# Patient Record
Sex: FEMALE | Race: BLACK OR AFRICAN AMERICAN | NOT HISPANIC OR LATINO | URBAN - METROPOLITAN AREA
[De-identification: names, ages, dates, MRNs, and addresses within clinical notes are randomized per-mention and may not be internally consistent; named-entity substitution may affect disease eponyms.]

---

## 2023-12-27 ENCOUNTER — TELEPHONE (OUTPATIENT)
Dept: OPHTHALMOLOGY | Facility: CLINIC | Age: 68
End: 2023-12-27
Payer: MEDICARE

## 2023-12-27 NOTE — TELEPHONE ENCOUNTER
----- Message from Chasity Fowler sent at 12/27/2023 12:05 PM CST -----  Regarding: FW: Referral    ----- Message -----  From: Lisette Xiong  Sent: 12/27/2023  11:55 AM CST  To: [facility] Staff  Subject: Referral                                         Rainer,    Provider Mustapha Dawson would like to refer the patient to the Ophthalmology department.        I have scanned the patients referral/records into .     Please review and contact patient to schedule appointment. If there are no appointments available at this time, please advise and I will inform the referring provider/clinic      Thank you,   [facility]

## 2024-01-03 ENCOUNTER — TELEPHONE (OUTPATIENT)
Dept: OPHTHALMOLOGY | Facility: CLINIC | Age: 69
End: 2024-01-03
Payer: MEDICARE

## 2024-01-23 ENCOUNTER — TELEPHONE (OUTPATIENT)
Dept: OPHTHALMOLOGY | Facility: CLINIC | Age: 69
End: 2024-01-23
Payer: MEDICARE

## 2024-01-24 ENCOUNTER — OFFICE VISIT (OUTPATIENT)
Dept: OPHTHALMOLOGY | Facility: CLINIC | Age: 69
End: 2024-01-24
Payer: MEDICARE

## 2024-01-24 DIAGNOSIS — Z96.1 PSEUDOPHAKIA OF BOTH EYES: ICD-10-CM

## 2024-01-24 DIAGNOSIS — H04.421 CHRONIC LACRIMAL CANALICULITIS OF RIGHT LACRIMAL PASSAGE: Primary | ICD-10-CM

## 2024-01-24 PROCEDURE — 1101F PT FALLS ASSESS-DOCD LE1/YR: CPT | Mod: CPTII,S$GLB,, | Performed by: OPHTHALMOLOGY

## 2024-01-24 PROCEDURE — 68840 EXPLORE/IRRIGATE TEAR DUCTS: CPT | Mod: 50,S$GLB,, | Performed by: OPHTHALMOLOGY

## 2024-01-24 PROCEDURE — 99204 OFFICE O/P NEW MOD 45 MIN: CPT | Mod: 25,S$GLB,, | Performed by: OPHTHALMOLOGY

## 2024-01-24 PROCEDURE — 3288F FALL RISK ASSESSMENT DOCD: CPT | Mod: CPTII,S$GLB,, | Performed by: OPHTHALMOLOGY

## 2024-01-24 PROCEDURE — 92285 EXTERNAL OCULAR PHOTOGRAPHY: CPT | Mod: S$GLB,,, | Performed by: OPHTHALMOLOGY

## 2024-01-24 PROCEDURE — 1159F MED LIST DOCD IN RCRD: CPT | Mod: CPTII,S$GLB,, | Performed by: OPHTHALMOLOGY

## 2024-01-24 PROCEDURE — 1160F RVW MEDS BY RX/DR IN RCRD: CPT | Mod: CPTII,S$GLB,, | Performed by: OPHTHALMOLOGY

## 2024-01-24 PROCEDURE — 99999 PR PBB SHADOW E&M-EST. PATIENT-LVL I: CPT | Mod: PBBFAC,,, | Performed by: OPHTHALMOLOGY

## 2024-01-24 RX ORDER — AMLODIPINE BESYLATE 5 MG/1
5 TABLET ORAL DAILY
COMMUNITY

## 2024-01-24 NOTE — PROGRESS NOTES
HPI    Shi Miranda is a/an 68 y.o. female here for possible dacryocystitis or   canaliculitis    Eye Meds:   Using Refresh tears prn    Pt states that OU are always runny with discharge,water and mucous   Problem has been going on for about a few years    States no pain or irritation   States tears are worse In OD compared to OS      Last edited by Marybeth Wynn on 1/24/2024  1:36 PM.            Assessment /Plan     For exam results, see Encounter Report.    Chronic lacrimal canaliculitis of right lacrimal passage  -     External Photography - OU - Both Eyes    Pseudophakia of both eyes      The patient is a pleasant 68-year-old female here for evaluation of right-sided tearing and purulent discharge.  This has been ongoing over the last 1-1/2 years.  The patient is referred by my colleague Dr. Dawson.  The patient has had successful cataract surgery with placement of posterior chamber intra-ocular lenses in November of 2023.  She is overall seeing very well.  She is history of chalazion incision and drainage from the right upper eyelid in the past.      On exam, the patient has an erythematous medial right lower eyelid with pouting punctum.  There is some purulence upon palpation of the right lower eyelid.    Irrigation:  OD:patent right upper punctum, canaliculus, and nld with 100 % flow to the nose  OS:patent lower punctum, canaliculus, and nld with 100% flow to the nose      These findings were discussed with the patient and they are consistent with right lower eyelid canaliculus.      Recommend removal of right lower eyelid dacryoliths with possible punctoplasty in the minor procedure room with Valium.      Pertinent risks benefits alternatives of the procedure were discussed with the patient prior to obtaining informed consent.      Hold ASA, NSAIDS, fish oil, Vitamin E and Multivitamins 5 to 7 days prior to procedure.     Return for surgery

## 2024-01-26 RX ORDER — DIAZEPAM 5 MG/1
TABLET ORAL
Qty: 1 TABLET | Refills: 0 | Status: SHIPPED | OUTPATIENT
Start: 2024-01-26

## 2024-01-29 ENCOUNTER — PROCEDURE VISIT (OUTPATIENT)
Dept: OPHTHALMOLOGY | Facility: CLINIC | Age: 69
End: 2024-01-29
Payer: MEDICARE

## 2024-01-29 VITALS — HEART RATE: 84 BPM | DIASTOLIC BLOOD PRESSURE: 89 MMHG | SYSTOLIC BLOOD PRESSURE: 150 MMHG

## 2024-01-29 DIAGNOSIS — H04.421 CHRONIC LACRIMAL CANALICULITIS OF RIGHT LACRIMAL PASSAGE: Primary | ICD-10-CM

## 2024-01-29 DIAGNOSIS — Z96.1 PSEUDOPHAKIA OF BOTH EYES: ICD-10-CM

## 2024-01-29 LAB
GRAM STN SPEC: NORMAL
GRAM STN SPEC: NORMAL

## 2024-01-29 PROCEDURE — 87070 CULTURE OTHR SPECIMN AEROBIC: CPT | Performed by: OPHTHALMOLOGY

## 2024-01-29 PROCEDURE — 87077 CULTURE AEROBIC IDENTIFY: CPT | Performed by: OPHTHALMOLOGY

## 2024-01-29 PROCEDURE — 88312 SPECIAL STAINS GROUP 1: CPT | Mod: 59 | Performed by: STUDENT IN AN ORGANIZED HEALTH CARE EDUCATION/TRAINING PROGRAM

## 2024-01-29 PROCEDURE — 87075 CULTR BACTERIA EXCEPT BLOOD: CPT | Performed by: OPHTHALMOLOGY

## 2024-01-29 PROCEDURE — 87205 SMEAR GRAM STAIN: CPT | Performed by: OPHTHALMOLOGY

## 2024-01-29 PROCEDURE — 99499 UNLISTED E&M SERVICE: CPT | Mod: S$GLB,,, | Performed by: OPHTHALMOLOGY

## 2024-01-29 PROCEDURE — 87076 CULTURE ANAEROBE IDENT EACH: CPT | Mod: 59 | Performed by: OPHTHALMOLOGY

## 2024-01-29 PROCEDURE — 88305 TISSUE EXAM BY PATHOLOGIST: CPT | Performed by: STUDENT IN AN ORGANIZED HEALTH CARE EDUCATION/TRAINING PROGRAM

## 2024-01-29 PROCEDURE — 68530 CLEARANCE OF TEAR DUCT: CPT | Mod: 58,RT,S$GLB, | Performed by: OPHTHALMOLOGY

## 2024-01-29 PROCEDURE — 88312 SPECIAL STAINS GROUP 1: CPT | Mod: 26,,, | Performed by: STUDENT IN AN ORGANIZED HEALTH CARE EDUCATION/TRAINING PROGRAM

## 2024-01-29 PROCEDURE — 88305 TISSUE EXAM BY PATHOLOGIST: CPT | Mod: 26,,, | Performed by: STUDENT IN AN ORGANIZED HEALTH CARE EDUCATION/TRAINING PROGRAM

## 2024-01-29 RX ORDER — NEOMYCIN SULFATE, POLYMYXIN B SULFATE AND DEXAMETHASONE 3.5; 10000; 1 MG/ML; [USP'U]/ML; MG/ML
1 SUSPENSION/ DROPS OPHTHALMIC 4 TIMES DAILY
Qty: 5 ML | Refills: 0 | Status: CANCELLED | OUTPATIENT
Start: 2024-01-29

## 2024-01-29 NOTE — PROGRESS NOTES
HPI    Patient here for TD probing/punctoplasty w/ dacryolith removal RLL  No ASA's/NSAIDS taken in the past 7 days.   Valium taken at arrival.    Last edited by Carly Brunner on 1/29/2024 10:47 AM.            Assessment /Plan     For exam results, see Encounter Report.    Chronic lacrimal canaliculitis of right lacrimal passage  -     Specimen to Pathology Ophthalmology  -     CULTURE, ANAEROBE  -     CULTURE, AEROBIC  (SPECIFY SOURCE)  -     Gram stain                 Procedure note:   Attending: Thelma Jesus MD  Resident: Joaquina Prasad MD  Pre-procedure diagnosis: canaliculitis, right eye  Post-proc diagnosis: same  Procedure: Removal of dacryoliths    Indications: Patient is a 68 y.o. female with bothersome persistent inflammation and purulent drainage of from lower eyelid punctum. Surgery discussed in detail. Risks/benefits/alternatives discussed and informed consent obtained, all questions answered.    Procedure description: Tetracaine drops given in both eyes. 2% lidocaine with epinephrine, 0.25% bupivacaine, and hyaluronidase were injected to the right lower conjunctiva and medial canthal area after alcohol swab. Good anesthesia achieved. Attention was turned to the right lower punctum where discharge was noted. An E-swab was used to collect purulent material which was sent for culture. Next, pressure was applied with cotton swabs to the right lower punctum and dacryoliths were immediately visible and extruded. A curette was then used to remove any remaining dacryoliths. The dacryoliths were sent to pathology. The right lower punctum was probed with #1/#2 Dixon probes and proceeded easily to the lacrimal bone without any stops. Topical Tobradex drops were instilled in the right eye at the conclusion of the procedure. Patient tolerated the procedure well. There were no complications.

## 2024-02-01 LAB — BACTERIA SPEC AEROBE CULT: ABNORMAL

## 2024-02-02 LAB
BACTERIA SPEC ANAEROBE CULT: ABNORMAL
BACTERIA SPEC ANAEROBE CULT: ABNORMAL

## 2024-02-07 LAB
FINAL PATHOLOGIC DIAGNOSIS: NORMAL
GROSS: NORMAL
Lab: NORMAL
MICROSCOPIC EXAM: NORMAL

## 2024-02-14 ENCOUNTER — OFFICE VISIT (OUTPATIENT)
Dept: OPHTHALMOLOGY | Facility: CLINIC | Age: 69
End: 2024-02-14
Payer: MEDICARE

## 2024-02-14 DIAGNOSIS — H04.421 CHRONIC LACRIMAL CANALICULITIS OF RIGHT LACRIMAL PASSAGE: Primary | ICD-10-CM

## 2024-02-14 DIAGNOSIS — Z96.1 PSEUDOPHAKIA OF BOTH EYES: ICD-10-CM

## 2024-02-14 PROCEDURE — 92285 EXTERNAL OCULAR PHOTOGRAPHY: CPT | Mod: S$GLB,,, | Performed by: OPHTHALMOLOGY

## 2024-02-14 PROCEDURE — 99999 PR PBB SHADOW E&M-EST. PATIENT-LVL I: CPT | Mod: PBBFAC,,, | Performed by: OPHTHALMOLOGY

## 2024-02-14 PROCEDURE — 99213 OFFICE O/P EST LOW 20 MIN: CPT | Mod: S$GLB,,, | Performed by: OPHTHALMOLOGY

## 2024-02-14 PROCEDURE — 1159F MED LIST DOCD IN RCRD: CPT | Mod: CPTII,S$GLB,, | Performed by: OPHTHALMOLOGY

## 2024-02-14 PROCEDURE — 1160F RVW MEDS BY RX/DR IN RCRD: CPT | Mod: CPTII,S$GLB,, | Performed by: OPHTHALMOLOGY

## 2024-02-14 NOTE — PROGRESS NOTES
HPI    Shi Miranda is a/an 68 y.o. female here for 2 week post op.  Date of procedure: 1/29/24  Procedure: removal of dacryoliths   Oral antibiotics: none    Eye meds: refresh gtts     Patient doing well following procedure. No pain or discomfort. She reports   much improvement with the tearing.     Last edited by Thelma Jesus MD on 2/14/2024  5:52 PM.            Assessment /Plan     For exam results, see Encounter Report.    Chronic lacrimal canaliculitis of right lacrimal passage    Pseudophakia of both eyes      Patient with minimal tearing and no more purulent discharge.     Return to Dr. Dawson for routine eye care.